# Patient Record
Sex: FEMALE | Race: WHITE | NOT HISPANIC OR LATINO | Employment: FULL TIME | URBAN - METROPOLITAN AREA
[De-identification: names, ages, dates, MRNs, and addresses within clinical notes are randomized per-mention and may not be internally consistent; named-entity substitution may affect disease eponyms.]

---

## 2023-09-13 ENCOUNTER — OFFICE VISIT (OUTPATIENT)
Dept: PEDIATRICS | Facility: CLINIC | Age: 23
End: 2023-09-13
Payer: COMMERCIAL

## 2023-09-13 VITALS — RESPIRATION RATE: 16 BRPM | WEIGHT: 149.69 LBS | BODY MASS INDEX: 22.69 KG/M2 | HEIGHT: 68 IN | TEMPERATURE: 97.9 F

## 2023-09-13 DIAGNOSIS — L03.115 CELLULITIS OF RIGHT LOWER EXTREMITY: Primary | ICD-10-CM

## 2023-09-13 PROCEDURE — 99214 OFFICE O/P EST MOD 30 MIN: CPT | Performed by: PEDIATRICS

## 2023-09-13 RX ORDER — SULFAMETHOXAZOLE AND TRIMETHOPRIM 800; 160 MG/1; MG/1
1 TABLET ORAL 2 TIMES DAILY
Qty: 14 TABLET | Refills: 0 | Status: SHIPPED | OUTPATIENT
Start: 2023-09-13 | End: 2023-09-20

## 2023-09-13 RX ORDER — CEFUROXIME AXETIL 500 MG/1
500 TABLET ORAL 2 TIMES DAILY
COMMUNITY
Start: 2023-09-12 | End: 2023-09-17

## 2023-09-13 RX ORDER — EPINEPHRINE 0.3 MG/.3ML
INJECTION SUBCUTANEOUS
COMMUNITY

## 2023-09-13 RX ORDER — NORETHINDRONE ACETATE/ETHINYL ESTRADIOL 1MG-20MCG
1 KIT ORAL DAILY
COMMUNITY
End: 2023-12-15

## 2023-09-13 ASSESSMENT — ENCOUNTER SYMPTOMS: PAIN: 1

## 2023-09-13 NOTE — LETTER
September 13, 2023     Patient: Iza Quigley   YOB: 2000   Date of Visit: 9/13/2023       To Whom It May Concern:    Iza Quigley was seen in my clinic on 9/13/2023 at 1:40 pm. Please excuse Iza for her absence from work on this day to make the appointment and on Thursday and Friday Sep 14th and 15th.    If you have any questions or concerns, please don't hesitate to call.         Sincerely,         Ashanti Frye MD        CC: No Recipients

## 2023-09-13 NOTE — PROGRESS NOTES
"Subjective   Patient ID: Iza Quigley is a 23 y.o. female who presents for Insect Bite, Edema, and Pain.    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  Did not go to work today  Some bruised areas  No more discharge from area, now scabbed over    No fever    Sitting outside but not in wooded area  [place]    Hard to walk  Not able to roll her ankle    Family friend prescribed cefuroxime 500mg BID, has taken 2 days worth of it      Edema  Pain         Review of Systems    Objective   Temp 36.6 °C (97.9 °F)   Resp 16   Ht 1.719 m (5' 7.68\")   Wt 67.9 kg (149 lb 11.1 oz)   BMI 22.98 kg/m²     Physical Exam  Musculoskeletal:        Feet:    Feet:      Comments: Right ankle with significant edema obscuring malleoli, tender to touch, lateral malleolus and superior to it with bruising visible. Small erythematous scab anterior ankle with surrounding erythema of about a palm size, swelling appears to extend to lower shin area  Skin:     General: Skin is warm and dry.      Findings: Bruising, erythema and lesion present.         Assessment/Plan   Problem List Items Addressed This Visit       Cellulitis of right lower extremity - Primary     Stop the antibiotic and start the new prescribed one. Please stay home from work the next 2 days, elevate your leg. You can bear weight on it.  If developing fever, redness or discharge please call right away.  Call if not better by Saturday.         Relevant Medications    sulfamethoxazole-trimethoprim (Bactrim DS) 800-160 mg tablet          "

## 2023-09-13 NOTE — ASSESSMENT & PLAN NOTE
Stop the antibiotic and start the new prescribed one. Please stay home from work the next 2 days, elevate your leg. You can bear weight on it.  If developing fever, redness or discharge please call right away.  Call if not better by Saturday.

## 2024-02-08 NOTE — PROGRESS NOTES
Subjective   Patient ID: Iza Quigley is a 24 y.o. female who presents for Establish Care and Annual Exam.  Well Adult Physical   Patient here for a comprehensive physical exam.The patient reports problems -patient has been taking clindamycin topically for her acne with good results, she also has been very happy with her birth control pills and would like to continue    Diet: Well-balanced    Exercise: Walking and active at work    Social History    Tobacco Use      Smoking status: Never      Smokeless tobacco: Never    Alcohol use: Yes      Comment: social    Drug use: Never        Immunization History  Administered            Date(s) Administered    DTaP vaccine, pediatric  (INFANRIX)                          2001  2001  2001                            07/15/2002  03/02/2006      DTaP, Unspecified     2001  2001  2001                            07/15/2002  03/02/2006      Flu vaccine (IIV4), preservative free *Check age/dose*                          11/10/2015  12/06/2017  10/12/2018                            01/04/2022      HPV 9-valent vaccine (GARDASIL 9)                          07/12/2016  10/05/2016  07/24/2017      Hepatitis A vaccine, pediatric/adolescent (HAVRIX, VAQTA)                          08/02/2011 06/11/2012      Hepatitis B vaccine, pediatric/adolescent (RECOMBIVAX, ENGERIX)                          2001 2001 01/26/2002      HiB PRP-OMP conjugate vaccine, pediatric (PEDVAXHIB)                          2001 2001 01/26/2002      HiB, unspecified      2001 2001 01/26/2002      Influenza Whole       11/26/2002 01/02/2003      Influenza, Unspecified                          11/26/2002  01/02/2003  09/23/2009                            10/07/2013      Influenza, live, intranasal                          11/03/2010  12/10/2012      Influenza, live, intranasal, quadrivalent                          11/03/2010  12/10/2012   11/05/2014      Influenza, seasonal, injectable, preservative free                          10/07/2013  11/04/2015  10/05/2016      MMR vaccine, subcutaneous (MMR II)                          04/20/2002 01/20/2003      Meningococcal B vaccine (BEXSERO)                          07/24/2017 08/02/2018      Meningococcal MCV4P   06/11/2012 07/24/2017      Novel influenza-H1N1-09, preservative-free                          02/15/2010      Pfizer Purple Cap SARS-CoV-2                          05/04/2021 05/25/2021 01/04/2022      Pneumococcal Conjugate PCV 7                          2001  2001  2001                            01/26/2002      Polio, Unspecified    2001  2001  2001                            03/02/2006      Poliovirus vaccine, subcutaneous (IPOL)                          2001  2001  2001                            03/02/2006      SARS-CoV-2, Unspecified                          01/04/2022      Tdap vaccine, age 7 year and older (BOOSTRIX, ADACEL)                          04/15/2011  08/18/2021  08/09/2023                            08/09/2023      Varicella vaccine, subcutaneous (VARIVAX)                          04/20/2002 08/14/2007      Menstrual cycle   When was your last period:    How often do they occur: Monthly   Do you have any concerns about your period:    Contraception: Oral birth control pills    Last pap: 2023  History of abnormal pap:                      Review of Systems   Constitutional:  Negative for appetite change, chills and fever.   HENT:  Negative for ear pain, rhinorrhea, sinus pain and sore throat.    Eyes:  Negative for discharge and visual disturbance.   Respiratory:  Negative for cough and shortness of breath.    Cardiovascular:  Negative for chest pain, palpitations and leg swelling.   Gastrointestinal:  Negative for abdominal distention, abdominal pain, blood in stool, constipation, diarrhea, nausea and vomiting.    Endocrine: Negative for cold intolerance, heat intolerance, polydipsia and polyuria.   Genitourinary:  Negative for difficulty urinating, dysuria and frequency.   Musculoskeletal:  Negative for back pain and myalgias.   Skin:  Negative for rash.   Neurological:  Negative for dizziness, weakness, numbness and headaches.   Hematological:  Negative for adenopathy.   Psychiatric/Behavioral:  Negative for agitation, confusion and hallucinations.        Objective   Physical Exam  Constitutional:       Appearance: Normal appearance.   Cardiovascular:      Rate and Rhythm: Normal rate and regular rhythm.   Pulmonary:      Effort: Pulmonary effort is normal.      Breath sounds: Normal breath sounds.   Chest:   Breasts:     Breasts are symmetrical.      Right: No mass, nipple discharge or skin change.      Left: No mass, nipple discharge or skin change.   Abdominal:      General: Abdomen is flat. Bowel sounds are normal.      Palpations: Abdomen is soft.   Lymphadenopathy:      Upper Body:      Right upper body: No axillary adenopathy.      Left upper body: No axillary adenopathy.   Skin:     General: Skin is warm and dry.   Neurological:      Mental Status: She is alert.   Psychiatric:         Mood and Affect: Mood normal.         Behavior: Behavior normal.         Assessment/Plan   Problem List Items Addressed This Visit       Healthcare maintenance - Primary    Relevant Orders    Lipid Panel    Glucose, fasting    HIV 1/2 Antigen/Antibody Screen with Reflex to Confirmation    Hepatitis C antibody    Encounter for surveillance of contraceptive pills    Relevant Medications    norethindrone ac-eth estradioL (Junel 1/20, 21,) 1-20 mg-mcg tablet    Acne    Relevant Medications    clindamycin (Cleocin T) 1 % gel     Follow-up in 1 year

## 2024-02-09 ENCOUNTER — OFFICE VISIT (OUTPATIENT)
Dept: PRIMARY CARE | Facility: CLINIC | Age: 24
End: 2024-02-09
Payer: COMMERCIAL

## 2024-02-09 VITALS
HEART RATE: 70 BPM | SYSTOLIC BLOOD PRESSURE: 112 MMHG | OXYGEN SATURATION: 97 % | HEIGHT: 68 IN | WEIGHT: 144 LBS | DIASTOLIC BLOOD PRESSURE: 84 MMHG | RESPIRATION RATE: 18 BRPM | BODY MASS INDEX: 21.82 KG/M2 | TEMPERATURE: 98 F

## 2024-02-09 DIAGNOSIS — L70.0 ACNE VULGARIS: ICD-10-CM

## 2024-02-09 DIAGNOSIS — Z30.41 ENCOUNTER FOR SURVEILLANCE OF CONTRACEPTIVE PILLS: ICD-10-CM

## 2024-02-09 DIAGNOSIS — Z00.00 HEALTHCARE MAINTENANCE: Primary | ICD-10-CM

## 2024-02-09 PROBLEM — L03.115 CELLULITIS OF RIGHT LOWER EXTREMITY: Status: RESOLVED | Noted: 2023-09-13 | Resolved: 2024-02-09

## 2024-02-09 PROBLEM — Z91.018 TREE NUT ALLERGY: Status: ACTIVE | Noted: 2024-02-09

## 2024-02-09 PROBLEM — L70.9 ACNE: Status: ACTIVE | Noted: 2024-02-09

## 2024-02-09 PROBLEM — Z91.010 PEANUT ALLERGY: Status: ACTIVE | Noted: 2024-02-09

## 2024-02-09 PROCEDURE — 1036F TOBACCO NON-USER: CPT | Performed by: FAMILY MEDICINE

## 2024-02-09 PROCEDURE — 99395 PREV VISIT EST AGE 18-39: CPT | Performed by: FAMILY MEDICINE

## 2024-02-09 RX ORDER — CLINDAMYCIN PHOSPHATE 10 MG/G
GEL TOPICAL 2 TIMES DAILY
Qty: 60 G | Refills: 11 | Status: SHIPPED | OUTPATIENT
Start: 2024-02-09 | End: 2025-02-08

## 2024-02-09 RX ORDER — EPINEPHRINE 0.3 MG/.3ML
1 INJECTION INTRAMUSCULAR ONCE AS NEEDED
COMMUNITY
End: 2024-02-09 | Stop reason: WASHOUT

## 2024-02-09 RX ORDER — NORETHINDRONE ACETATE AND ETHINYL ESTRADIOL .02; 1 MG/1; MG/1
1 TABLET ORAL DAILY
Qty: 84 TABLET | Refills: 3 | Status: SHIPPED | OUTPATIENT
Start: 2024-02-09

## 2024-02-09 ASSESSMENT — ENCOUNTER SYMPTOMS
POLYDIPSIA: 0
DYSURIA: 0
COUGH: 0
AGITATION: 0
DIZZINESS: 0
CONFUSION: 0
SORE THROAT: 0
PALPITATIONS: 0
SHORTNESS OF BREATH: 0
HEADACHES: 0
DIARRHEA: 0
CONSTIPATION: 0
SINUS PAIN: 0
NAUSEA: 0
ABDOMINAL DISTENTION: 0
DIFFICULTY URINATING: 0
FREQUENCY: 0
ADENOPATHY: 0
HALLUCINATIONS: 0
VOMITING: 0
CHILLS: 0
EYE DISCHARGE: 0
RHINORRHEA: 0
APPETITE CHANGE: 0
BACK PAIN: 0
MYALGIAS: 0
NUMBNESS: 0
BLOOD IN STOOL: 0
WEAKNESS: 0
FEVER: 0
ABDOMINAL PAIN: 0

## 2024-04-04 ENCOUNTER — OFFICE VISIT (OUTPATIENT)
Dept: PRIMARY CARE | Facility: CLINIC | Age: 24
End: 2024-04-04
Payer: COMMERCIAL

## 2024-04-04 VITALS
SYSTOLIC BLOOD PRESSURE: 122 MMHG | WEIGHT: 134 LBS | TEMPERATURE: 98.3 F | DIASTOLIC BLOOD PRESSURE: 84 MMHG | HEART RATE: 76 BPM | RESPIRATION RATE: 18 BRPM | OXYGEN SATURATION: 99 % | BODY MASS INDEX: 20.37 KG/M2

## 2024-04-04 DIAGNOSIS — K52.9 GASTROENTERITIS: Primary | ICD-10-CM

## 2024-04-04 DIAGNOSIS — R19.7 DIARRHEA, UNSPECIFIED TYPE: ICD-10-CM

## 2024-04-04 PROCEDURE — 99214 OFFICE O/P EST MOD 30 MIN: CPT | Performed by: FAMILY MEDICINE

## 2024-04-04 RX ORDER — ONDANSETRON 4 MG/1
4 TABLET, FILM COATED ORAL EVERY 8 HOURS PRN
Qty: 20 TABLET | Refills: 0 | Status: SHIPPED | OUTPATIENT
Start: 2024-04-04 | End: 2024-04-11

## 2024-04-04 NOTE — LETTER
April 4, 2024     Patient: Iza Quigley   YOB: 2000   Date of Visit: 4/4/2024       To Whom It May Concern:    Iza Quigley was seen in my clinic on 4/4/2024 at 5:45 pm. Please excuse Iza for her absence from work on this day to make the appointment.  She is able to return to work on 4/8/24    If you have any questions or concerns, please don't hesitate to call.         Sincerely,         Ashley Park MD        CC: No Recipients

## 2024-04-04 NOTE — PROGRESS NOTES
Subjective   Patient ID: Iza Quigley is a 24 y.o. female who presents for Nausea and Diarrhea.  HPI    Tues morning felt fine, then in the afternoon on Tuesday developed nausea vomiting and diarrhea.  She has had intermittent hot and cold flashes.  The nausea has persisted through today and she continues to have frequent loose stools particularly in the morning.  For example today she had 10 loose watery stools between 8 and noon but none since then.  She did go out to lunch with a friend of hers for hibachi Tuesday afternoon however her friend is not ill and they ate the same food.      Teacher    Review of Systems    Objective   Physical Exam  Constitutional:       Appearance: Normal appearance.   Cardiovascular:      Rate and Rhythm: Normal rate and regular rhythm.   Pulmonary:      Effort: Pulmonary effort is normal.      Breath sounds: Normal breath sounds.   Abdominal:      General: Abdomen is flat. Bowel sounds are normal.      Palpations: Abdomen is soft.   Skin:     General: Skin is warm and dry.   Neurological:      Mental Status: She is alert.   Psychiatric:         Mood and Affect: Mood normal.         Behavior: Behavior normal.         Assessment/Plan   Problem List Items Addressed This Visit       Gastroenteritis - Primary    Relevant Medications    ondansetron (Zofran) 4 mg tablet    Other Relevant Orders    C. difficile, PCR    Stool Pathogen Panel, PCR     Other Visit Diagnoses       Diarrhea, unspecified type        Relevant Medications    ondansetron (Zofran) 4 mg tablet    Other Relevant Orders    C. difficile, PCR    Stool Pathogen Panel, PCR          Follow-up if not improving by Monday

## 2024-04-04 NOTE — LETTER
April 4, 2024     Patient: Iza Quigley   YOB: 2000   Date of Visit: 4/4/2024       To Whom It May Concern:    Iza Quigley was seen in my clinic on 4/4/2024 at 5:45 pm. Please excuse Iza for her absence from work on this day to make the appointment.    If you have any questions or concerns, please don't hesitate to call.         Sincerely,         Ashley Park MD        CC: No Recipients

## 2024-04-24 DIAGNOSIS — L70.0 ACNE VULGARIS: ICD-10-CM

## 2024-04-25 RX ORDER — CLINDAMYCIN PHOSPHATE AND BENZOYL PEROXIDE 10; 50 MG/G; MG/G
GEL TOPICAL
Qty: 45 G | Refills: 0 | Status: SHIPPED | OUTPATIENT
Start: 2024-04-25

## 2024-08-09 ENCOUNTER — TELEPHONE (OUTPATIENT)
Dept: PRIMARY CARE | Facility: CLINIC | Age: 24
End: 2024-08-09

## 2024-08-09 NOTE — TELEPHONE ENCOUNTER
Iza Quigley phoned, [phone]. She would like to speak with you regarding her new birth control medicine. She has some questions. She did not know the name of the medication.  The medication below is the one listed on her chart.       norethindrone ac-eth estradioL (Junel 1/20, 21,) 1-20 mg-mcg tablet [[id number]]    Order Details  Dose: 1 tablet Route: oral Frequency: Daily   Dispense Quantity: 84 tablet Refills: 3          Sig: Take 1 tablet by mouth once daily.         Start Date: 02/09/24 End Date: --   Written Date: 02/09/24 Rx Expiration Date: 02/08/25        Associated Diagnoses: Encounter for surveillance of contraceptive pills [Z30.41]

## 2024-08-27 ENCOUNTER — APPOINTMENT (OUTPATIENT)
Dept: PRIMARY CARE | Facility: CLINIC | Age: 24
End: 2024-08-27
Payer: COMMERCIAL

## 2024-08-27 VITALS
BODY MASS INDEX: 20.52 KG/M2 | TEMPERATURE: 97.3 F | RESPIRATION RATE: 18 BRPM | HEART RATE: 68 BPM | HEIGHT: 68 IN | SYSTOLIC BLOOD PRESSURE: 94 MMHG | DIASTOLIC BLOOD PRESSURE: 68 MMHG | WEIGHT: 135.4 LBS | OXYGEN SATURATION: 100 %

## 2024-08-27 DIAGNOSIS — N93.9 ABNORMAL UTERINE BLEEDING (AUB): Primary | ICD-10-CM

## 2024-08-27 DIAGNOSIS — Z30.41 ENCOUNTER FOR SURVEILLANCE OF CONTRACEPTIVE PILLS: ICD-10-CM

## 2024-08-27 LAB
ERYTHROCYTE [DISTWIDTH] IN BLOOD BY AUTOMATED COUNT: 11.5 % (ref 11.5–14.5)
FSH SERPL-ACNC: 4.9 IU/L
HCT VFR BLD AUTO: 37.7 % (ref 36–46)
HGB BLD-MCNC: 12.3 G/DL (ref 12–16)
LH SERPL-ACNC: 6.1 IU/L
MCH RBC QN AUTO: 30.4 PG (ref 26–34)
MCHC RBC AUTO-ENTMCNC: 32.6 G/DL (ref 32–36)
MCV RBC AUTO: 93 FL (ref 80–100)
NRBC BLD-RTO: 0 /100 WBCS (ref 0–0)
PLATELET # BLD AUTO: 231 X10*3/UL (ref 150–450)
PROLACTIN SERPL-MCNC: 14 UG/L (ref 3–20)
RBC # BLD AUTO: 4.05 X10*6/UL (ref 4–5.2)
TSH SERPL-ACNC: 1.41 MIU/L (ref 0.44–3.98)
WBC # BLD AUTO: 5.1 X10*3/UL (ref 4.4–11.3)

## 2024-08-27 PROCEDURE — 83002 ASSAY OF GONADOTROPIN (LH): CPT

## 2024-08-27 PROCEDURE — 84443 ASSAY THYROID STIM HORMONE: CPT

## 2024-08-27 PROCEDURE — 84146 ASSAY OF PROLACTIN: CPT

## 2024-08-27 PROCEDURE — 83498 ASY HYDROXYPROGESTERONE 17-D: CPT

## 2024-08-27 PROCEDURE — 85027 COMPLETE CBC AUTOMATED: CPT

## 2024-08-27 PROCEDURE — 99214 OFFICE O/P EST MOD 30 MIN: CPT | Performed by: FAMILY MEDICINE

## 2024-08-27 PROCEDURE — 3008F BODY MASS INDEX DOCD: CPT | Performed by: FAMILY MEDICINE

## 2024-08-27 PROCEDURE — 84402 ASSAY OF FREE TESTOSTERONE: CPT

## 2024-08-27 PROCEDURE — 83001 ASSAY OF GONADOTROPIN (FSH): CPT

## 2024-08-27 RX ORDER — NORETHINDRONE ACETATE AND ETHINYL ESTRADIOL .02; 1 MG/1; MG/1
1 TABLET ORAL DAILY
Qty: 84 TABLET | Refills: 3 | Status: SHIPPED | OUTPATIENT
Start: 2024-08-27

## 2024-08-27 NOTE — PROGRESS NOTES
Subjective   Patient ID: Iza Quigley is a 24 y.o. female who presents for Follow-up (Irregular periods).  HPI    Prior to 4 years ago patient had her heavy periods despite being on birth control pills and then they seem to lighten up a little bit and were much more tolerable but then 2-1/2 years ago they became more irregular often spotting in between when she should have been getting her period but she would stop taking her birth control pills then and not resume them until her period was over.    She noticed this most often after intercourse.  She is taken multiple pregnancy tests at home and all of have been negative.      She denies any vaginal discharge or discomfort.      Review of Systems    Objective   Physical Exam  Constitutional:       Appearance: Normal appearance.   Cardiovascular:      Rate and Rhythm: Normal rate and regular rhythm.   Pulmonary:      Effort: Pulmonary effort is normal.      Breath sounds: Normal breath sounds.   Abdominal:      General: Abdomen is flat. Bowel sounds are normal.      Palpations: Abdomen is soft.   Skin:     General: Skin is warm and dry.   Neurological:      Mental Status: She is alert.   Psychiatric:         Mood and Affect: Mood normal.         Behavior: Behavior normal.         Assessment/Plan   Problem List Items Addressed This Visit       Encounter for surveillance of contraceptive pills    Relevant Medications    norethindrone ac-eth estradioL (Junel 1/20, 21,) 1-20 mg-mcg tablet    Other Relevant Orders    US pelvis     Other Visit Diagnoses       Abnormal uterine bleeding (AUB)    -  Primary    Relevant Orders    US pelvis    Prolactin    Testosterone,Free and Total    Luteinizing Hormone    Follicle Stimulating Hormone    17-Hydroxyprogesterone    Thyroid Stimulating Hormone    CBC          Follow up in 3 mos

## 2024-09-01 LAB
17OHP SERPL-MCNC: 205.52 NG/DL
TESTOSTERONE FREE (CHAN): 3.1 PG/ML (ref 0.1–6.4)
TESTOSTERONE,TOTAL,LC-MS/MS: 45 NG/DL (ref 2–45)

## 2024-09-02 NOTE — RESULT ENCOUNTER NOTE
Please call the patient. Normal blood work. Ultrasound pending. Will call with results when received. Follow up with Dr. Park as scheduled. TY

## 2024-09-12 ENCOUNTER — HOSPITAL ENCOUNTER (OUTPATIENT)
Dept: RADIOLOGY | Facility: CLINIC | Age: 24
Discharge: HOME | End: 2024-09-12
Payer: COMMERCIAL

## 2024-09-12 DIAGNOSIS — N93.9 ABNORMAL UTERINE BLEEDING (AUB): ICD-10-CM

## 2024-09-12 DIAGNOSIS — Z30.41 ENCOUNTER FOR SURVEILLANCE OF CONTRACEPTIVE PILLS: ICD-10-CM

## 2024-09-12 PROCEDURE — 76856 US EXAM PELVIC COMPLETE: CPT

## 2024-09-14 NOTE — RESULT ENCOUNTER NOTE
Please call the patient. No acute findings on ultrasound. Follow up in 2 months (she is scheduled for 2/10/25; needs to be seen earlier). Schedule appointment with Dr. Park. TY

## 2025-02-09 NOTE — PROGRESS NOTES
Subjective   Patient ID: Iza Quigley is a 25 y.o. female who presents for Annual Exam.  Well Adult Physical   Patient here for a comprehensive physical exam.The patient reports problems - Has not been taking OCPs, since then acne has been worsening and menses are heavy, more diffuse low back pain.  Typically worse when on menses     Diet: Well balanced     Exercise: walking 3-4 days per week at work     Social History    Tobacco Use      Smoking status: Never      Smokeless tobacco: Never    Alcohol use: Yes      Comment: social    Drug use: Never        Immunization History  Administered            Date(s) Administered    DTaP vaccine, pediatric  (INFANRIX)                          2001  2001  2001                            07/15/2002  03/02/2006      DTaP, Unspecified     2001  2001  2001                            07/15/2002  03/02/2006      Flu vaccine (IIV4), preservative free *Check age/dose*                          11/10/2015  12/06/2017  10/12/2018                            01/04/2022      Flu vaccine, trivalent, preservative free, age 6 months and greater (Fluarix/Fluzone/Flulaval)                          10/07/2013  11/04/2015  10/05/2016      HPV 9-valent vaccine (GARDASIL 9)                          07/12/2016  10/05/2016  07/24/2017      Hepatitis A vaccine, pediatric/adolescent (HAVRIX, VAQTA)                          08/02/2011 06/11/2012      Hepatitis B vaccine, 19 yrs and under (RECOMBIVAX, ENGERIX)                          2001 2001 01/26/2002      HiB PRP-OMP conjugate vaccine, pediatric (PEDVAXHIB)                          2001 2001 01/26/2002      HiB, unspecified      2001 2001 01/26/2002      Influenza Whole       11/26/2002 01/02/2003      Influenza, Unspecified                          11/26/2002  01/02/2003  09/23/2009                            10/07/2013      Influenza, live, intranasal                           11/03/2010  12/10/2012      Influenza, live, intranasal, quadrivalent                          11/03/2010  12/10/2012  11/05/2014      MMR vaccine, subcutaneous (MMR II)                          04/20/2002 01/20/2003      Meningococcal ACWY-D (Menactra) 4-valent conjugate vaccine                          06/11/2012 07/24/2017      Meningococcal B vaccine (BEXSERO)                          07/24/2017 08/02/2018      Novel influenza-H1N1-09, preservative-free                          02/15/2010      Pfizer Purple Cap SARS-CoV-2                          05/04/2021 05/25/2021 01/04/2022      Pneumococcal Conjugate PCV 7                          2001  2001  2001                            01/26/2002      Polio, Unspecified    2001  2001  2001                            03/02/2006      Poliovirus vaccine, subcutaneous (IPOL)                          2001  2001  2001                            03/02/2006      SARS-CoV-2, Unspecified                          01/04/2022      Tdap vaccine, age 7 year and older (BOOSTRIX, ADACEL)                          04/15/2011  08/18/2021  08/09/2023                            08/09/2023      Varicella vaccine, subcutaneous (VARIVAX)                          04/20/2002 08/14/2007      Menstrual cycle   When was your last period: 2/8/2025   How often do they occur: 1-2 times per month   Do you have any concerns about your period: would like to resume OCPS due to heaviness and some acne progression    Contraception: non currently    Last pap: 12/2022  History of abnormal pap: no                     Review of Systems   Constitutional:  Negative for appetite change, chills and fever.   HENT:  Negative for ear pain, rhinorrhea, sinus pain and sore throat.    Eyes:  Negative for discharge and visual disturbance.   Respiratory:  Negative for cough and shortness of breath.    Cardiovascular:  Negative for chest pain,  palpitations and leg swelling.   Gastrointestinal:  Negative for abdominal distention, abdominal pain, blood in stool, constipation, diarrhea, nausea and vomiting.   Endocrine: Negative for cold intolerance, heat intolerance, polydipsia and polyuria.   Genitourinary:  Negative for difficulty urinating, dysuria and frequency.   Musculoskeletal:  Negative for back pain and myalgias.   Skin:  Negative for rash.   Neurological:  Negative for dizziness, weakness, numbness and headaches.   Hematological:  Negative for adenopathy.   Psychiatric/Behavioral:  Negative for agitation, confusion and hallucinations.        Objective   Physical Exam  Constitutional:       Appearance: Normal appearance.   Cardiovascular:      Rate and Rhythm: Normal rate and regular rhythm.   Pulmonary:      Effort: Pulmonary effort is normal.      Breath sounds: Normal breath sounds.   Chest:   Breasts:     Breasts are symmetrical.      Right: No mass, nipple discharge or skin change.      Left: No mass, nipple discharge or skin change.   Abdominal:      General: Abdomen is flat. Bowel sounds are normal.      Palpations: Abdomen is soft.   Lymphadenopathy:      Upper Body:      Right upper body: No axillary adenopathy.      Left upper body: No axillary adenopathy.   Skin:     General: Skin is warm and dry.   Neurological:      Mental Status: She is alert.   Psychiatric:         Mood and Affect: Mood normal.         Behavior: Behavior normal.         Assessment & Plan  Encounter for surveillance of contraceptive pills         Acne vulgaris    Orders:    clindamycin-benzoyl peroxide (Duac) 1.2-5% gel; Apply topically once daily at bedtime.    Healthcare maintenance    Orders:    Lipid Panel    Glucose, fasting    HIV 1/2 Antigen/Antibody Screen with Reflex to Confirmation    Hepatitis C antibody    Tree nut allergy    Orders:    EPINEPHrine 0.3 mg/0.3 mL injection syringe; Inject 0.3 mL (0.3 mg) into the muscle 1 time if needed for anaphylaxis for  up to 1 dose. Inject into upper leg. Call 911 after use.       Follow up in 1 year

## 2025-02-10 ENCOUNTER — APPOINTMENT (OUTPATIENT)
Dept: PRIMARY CARE | Facility: CLINIC | Age: 25
End: 2025-02-10
Payer: COMMERCIAL

## 2025-02-10 VITALS
SYSTOLIC BLOOD PRESSURE: 114 MMHG | DIASTOLIC BLOOD PRESSURE: 80 MMHG | OXYGEN SATURATION: 96 % | RESPIRATION RATE: 16 BRPM | WEIGHT: 133.2 LBS | BODY MASS INDEX: 20.19 KG/M2 | HEART RATE: 76 BPM | TEMPERATURE: 98 F | HEIGHT: 68 IN

## 2025-02-10 DIAGNOSIS — Z30.41 ENCOUNTER FOR SURVEILLANCE OF CONTRACEPTIVE PILLS: ICD-10-CM

## 2025-02-10 DIAGNOSIS — Z91.018 TREE NUT ALLERGY: ICD-10-CM

## 2025-02-10 DIAGNOSIS — Z00.00 HEALTHCARE MAINTENANCE: Primary | ICD-10-CM

## 2025-02-10 DIAGNOSIS — L70.0 ACNE VULGARIS: ICD-10-CM

## 2025-02-10 PROBLEM — K52.9 GASTROENTERITIS: Status: RESOLVED | Noted: 2024-04-04 | Resolved: 2025-02-10

## 2025-02-10 PROCEDURE — 1036F TOBACCO NON-USER: CPT | Performed by: FAMILY MEDICINE

## 2025-02-10 PROCEDURE — 3008F BODY MASS INDEX DOCD: CPT | Performed by: FAMILY MEDICINE

## 2025-02-10 PROCEDURE — 99395 PREV VISIT EST AGE 18-39: CPT | Performed by: FAMILY MEDICINE

## 2025-02-10 RX ORDER — EPINEPHRINE 0.3 MG/.3ML
1 INJECTION SUBCUTANEOUS ONCE AS NEEDED
Qty: 2 ML | Refills: 1 | Status: SHIPPED | OUTPATIENT
Start: 2025-02-10

## 2025-02-10 RX ORDER — CLINDAMYCIN PHOSPHATE AND BENZOYL PEROXIDE 10; 50 MG/G; MG/G
GEL TOPICAL NIGHTLY
Qty: 45 G | Refills: 3 | Status: SHIPPED | OUTPATIENT
Start: 2025-02-10

## 2025-02-10 ASSESSMENT — ENCOUNTER SYMPTOMS
BLOOD IN STOOL: 0
WEAKNESS: 0
DIARRHEA: 0
DIFFICULTY URINATING: 0
FEVER: 0
PALPITATIONS: 0
AGITATION: 0
CONSTIPATION: 0
ABDOMINAL DISTENTION: 0
POLYDIPSIA: 0
RHINORRHEA: 0
HEADACHES: 0
EYE DISCHARGE: 0
FREQUENCY: 0
ABDOMINAL PAIN: 0
NAUSEA: 0
DIZZINESS: 0
NUMBNESS: 0
ADENOPATHY: 0
SHORTNESS OF BREATH: 0
SORE THROAT: 0
MYALGIAS: 0
HALLUCINATIONS: 0
CHILLS: 0
VOMITING: 0
BACK PAIN: 0
DYSURIA: 0
SINUS PAIN: 0
COUGH: 0
APPETITE CHANGE: 0
CONFUSION: 0

## 2025-02-10 ASSESSMENT — PATIENT HEALTH QUESTIONNAIRE - PHQ9
2. FEELING DOWN, DEPRESSED OR HOPELESS: NOT AT ALL
SUM OF ALL RESPONSES TO PHQ9 QUESTIONS 1 AND 2: 0
1. LITTLE INTEREST OR PLEASURE IN DOING THINGS: NOT AT ALL

## 2025-02-10 NOTE — ASSESSMENT & PLAN NOTE
Orders:    EPINEPHrine 0.3 mg/0.3 mL injection syringe; Inject 0.3 mL (0.3 mg) into the muscle 1 time if needed for anaphylaxis for up to 1 dose. Inject into upper leg. Call 911 after use.

## 2025-02-10 NOTE — ASSESSMENT & PLAN NOTE
Orders:    clindamycin-benzoyl peroxide (Duac) 1.2-5% gel; Apply topically once daily at bedtime.

## 2025-02-10 NOTE — ASSESSMENT & PLAN NOTE
Orders:    Lipid Panel    Glucose, fasting    HIV 1/2 Antigen/Antibody Screen with Reflex to Confirmation    Hepatitis C antibody

## 2025-02-11 LAB — GLUCOSE P FAST SERPL-MCNC: NORMAL MG/DL

## 2025-02-13 LAB
CHOLEST SERPL-MCNC: 148 MG/DL
CHOLEST/HDLC SERPL: 2.1 (CALC)
GLUCOSE SERPL-MCNC: 78 MG/DL (ref 65–99)
HCV AB SERPL QL IA: NORMAL
HDLC SERPL-MCNC: 70 MG/DL
HIV 1+2 AB+HIV1 P24 AG SERPL QL IA: NORMAL
LDLC SERPL CALC-MCNC: 60 MG/DL (CALC)
NONHDLC SERPL-MCNC: 78 MG/DL (CALC)
TRIGL SERPL-MCNC: 92 MG/DL

## 2025-02-25 ENCOUNTER — TELEPHONE (OUTPATIENT)
Dept: PRIMARY CARE | Facility: CLINIC | Age: 25
End: 2025-02-25
Payer: COMMERCIAL

## 2025-02-25 NOTE — TELEPHONE ENCOUNTER
Shannon called because she needs her physical signed and work placement documents for student teaching signed as well.  she will be faxing over the papers she needs sign.

## 2025-04-23 ENCOUNTER — APPOINTMENT (OUTPATIENT)
Dept: ALLERGY | Facility: CLINIC | Age: 25
End: 2025-04-23
Payer: COMMERCIAL

## 2025-06-06 ENCOUNTER — TELEPHONE (OUTPATIENT)
Dept: PRIMARY CARE | Facility: CLINIC | Age: 25
End: 2025-06-06
Payer: COMMERCIAL

## 2025-06-06 DIAGNOSIS — Z30.41 ENCOUNTER FOR SURVEILLANCE OF CONTRACEPTIVE PILLS: ICD-10-CM

## 2025-06-06 RX ORDER — NORETHINDRONE ACETATE AND ETHINYL ESTRADIOL .02; 1 MG/1; MG/1
1 TABLET ORAL DAILY
Qty: 84 TABLET | Refills: 0 | Status: SHIPPED | OUTPATIENT
Start: 2025-06-06

## 2025-06-06 NOTE — TELEPHONE ENCOUNTER
Spoke with the patient on the phone after being contacted by the answering service. The patient needs a refill of her oral birth control medication she has been taking the medication continuously and as prescribed since her last office visit with Dr. Park. She will follow up with Dr. Park for further refills. No other concerns today.

## 2025-06-24 NOTE — PROGRESS NOTES
Subjective   Patient ID:   [id number]   Iza Quigley is a 25 y.o. female who presents for Allergy Testing (NPV, previous pt of dr buitrago ).    Chief Complaint   Patient presents with    Allergy Testing     [facility], previous pt of dr buitrago       This is a new patient, presenting for allergy testing.    Patient has a H/O allergy symptoms since childhood and treated here several years ago.  She believes for insurance reasons she was switched to Dr. Buitrago.  He treated her for tree nut, peanut, bee venom and house dust allergies.  She reports her first reaction at age 19 occurred after eating a protein brownie bar that contained different nuts.  She notes it took about an hour to react and she went home.  She subsequently had emesis, diarrhea and throat tightening.  She called her mother who administered an EpiPen.  She has never had to use it since and currently avoids all tree nuts, almonds and peanuts.  She does not recall a reaction to peanut and she can consume products cooked in peanut oil with no reaction.  While in college, she had walnut dressing and developed an itchy throat but was surprised that the Sx did not progress to a severe one.  She has had drinks containing nuts but she never had any concerning reaction.    When she goes out to drink, she wants to drink Frangelico Hazelnut and had no problem.  She is unsure if they contain hazelnut or not.    A month ago, she accidentally almost ate an almond but spit it out before she could react.    10-9-2013 - SPT was positive to trees, cockroach, cat, weeds and grasses, Brazil nut, cashew, black walnut.  7-2-2008 - SPT was positive to dust mite, cat, ragweed, trees, mold, Black walnut, pistachio, coconut, weeds, grasses and horse.    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    Objective   Pulse 65   Wt 59.9 kg (132 lb)   SpO2 98%   BMI 20.07 kg/m²      Physical Exam  Constitutional:       Appearance: Normal appearance.   HENT:       Head: Normocephalic and atraumatic.   Eyes:      Extraocular Movements: Extraocular movements intact.      Conjunctiva/sclera: Conjunctivae normal.      Pupils: Pupils are equal, round, and reactive to light.   Pulmonary:      Effort: Pulmonary effort is normal.   Musculoskeletal:      Cervical back: Normal range of motion.   Neurological:      Mental Status: She is alert and oriented to person, place, and time. Mental status is at baseline.   Psychiatric:         Mood and Affect: Mood normal.         Behavior: Behavior normal.         Thought Content: Thought content normal.         Judgment: Judgment normal.     Allergy testing was performed on [employer] using standard technique. There were no immediate complications.    Test Results  Panel 1 Common Foods  1.   Histamine: 5X5  6.   Peanut: 0  10. [place]: 0  Panel 2 Nuts  1.   [place]: 0  2.   [place]: 5  3.   [place]: 5  4.   Cashew: 5  5.   Hazelnut: 0  7.   Pecan: 0  8.   Pistachio: 5    Skin testing is positive to cashew, pistachio, walnut and [place] nut.  [place] was mildly positive.      Assessment/Plan     Food allergy  She has allergies since childhood, initially treated here several years ago.  For insurance reasons, she was switched to Dr. Wiggins who performed testing positive to several aeroallergens and tree nuts.  Her first reaction was at age 19 after eating a protein brownie bar that contained different nuts.  An hour later, she had emesis, diarrhea and throat tightening and her mother gave her Epi.  She has not used it since.  She avoids all tree nuts, almonds and peanuts.  She does not recall a reaction to peanut and consumes products cooked in peanut oil with no reaction.  After having walnut dressing, she only developed an itchy throat but no progression of Sx.    Skin testing is positive to cashew, pistachio, walnut and [place] nut.  [place] was mildly positive.  Avoid these as well as pecan until results return.    Complete  blood work to evaluate almonds and peanut.      I discussed Xolair injections for accidental ingestions versus desensitization to tree nuts with Dr. Aster Garcia or [facility].     I ordered a Neffy nasal spray from [facility] with the same epinephrine you'd get from a needle-injector.  She has been hesitated to use her epi in the past due to the pain with the injection and her needle phobia. Neffy is her best option for rapid use of epi with accidental ingestion to her multiple allergens.    By signing my name below, I, Jase Mosqueraibe, attest that this documentation has been prepared under the direction and in the presence of Parvin Epperson MD.  All medical record entries made by the Scribe were at my direction and personally dictated by me. I have reviewed the chart and agree that the record accurately reflects my personal performance of the history, physical exam, discussion and plan.

## 2025-06-26 ENCOUNTER — APPOINTMENT (OUTPATIENT)
Dept: ALLERGY | Facility: CLINIC | Age: 25
End: 2025-06-26
Payer: COMMERCIAL

## 2025-06-26 VITALS — OXYGEN SATURATION: 98 % | BODY MASS INDEX: 20.07 KG/M2 | WEIGHT: 132 LBS | HEART RATE: 65 BPM

## 2025-06-26 DIAGNOSIS — Z91.018 FOOD ALLERGY: Primary | ICD-10-CM

## 2025-06-26 PROCEDURE — 99204 OFFICE O/P NEW MOD 45 MIN: CPT | Performed by: ALLERGY & IMMUNOLOGY

## 2025-06-26 PROCEDURE — 95004 PERQ TESTS W/ALRGNC XTRCS: CPT | Performed by: ALLERGY & IMMUNOLOGY

## 2025-06-26 RX ORDER — EPINEPHRINE 2 MG/100UL
1 SPRAY NASAL ONCE AS NEEDED
Qty: 2 EACH | Refills: 0 | Status: SHIPPED | OUTPATIENT
Start: 2025-06-26

## 2025-06-26 NOTE — PATIENT INSTRUCTIONS
Skin testing is positive to cashew, pistachio, walnut and [place] nut.  [place] was mildly positive.  Avoid these as well as pecan until results return.    Complete blood work to evaluate almonds and peanut.  I will follow up with you with results and further recommendations.     Consider Xolair injections for accidental ingestions versus desensitization to tree nuts with Dr. Aster Garcia or [facility].     Neffy is a nasal spray from [facility] with the same epinephrine you'd get from a needle-injector.  Alert  of your allergies.  If there is accidental exposure and you develop a few hives or itching, take 2 Zyrtec.  If you develops swelling of lips/tongue/mouth, hives all over, SOB, noisy breathing or difficulty swallowing, administer Neffy one spray.  Do not sniff during or after the dose is given.  If any liquid drips out of the nose, you may need to give a second dose of neffy after checking for symptoms.

## 2025-06-26 NOTE — ASSESSMENT & PLAN NOTE
She has allergies since childhood, initially treated here several years ago.  For insurance reasons, she was switched to Dr. Wiggins who performed testing positive to several aeroallergens and tree nuts.  Her first reaction was at age 19 after eating a protein brownie bar that contained different nuts.  An hour later, she had emesis, diarrhea and throat tightening and her mother gave her Epi.  She has not used it since.  She avoids all tree nuts, almonds and peanuts.  She does not recall a reaction to peanut and consumes products cooked in peanut oil with no reaction.  After having walnut dressing, she only developed an itchy throat but no progression of Sx.    Skin testing is positive to cashew, pistachio, walnut and Brazil nut.  [place] was mildly positive.  Avoid these as well as pecan until results return.    Complete blood work to evaluate almonds and peanut.      I discussed Xolair injections for accidental ingestions versus desensitization to tree nuts with Dr. Aster Garcia or [facility].     I ordered a Neffy nasal spray from [facility] with the same epinephrine you'd get from a needle-injector.  She has been hesitated to use her epi in the past due to the pain with the injection and her needle phobia. Neffy is her best option for rapid use of epi with accidental ingestion to her multiple allergens.

## 2025-06-26 NOTE — LETTER
June 28, 2025     Ashley Park MD  [address]  [facility] [address]    Patient: Iza Quigley   YOB: 2000   Date of Visit: 6/26/2025       Dear Dr. Ashley Park MD:    Thank you for referring Iza Quigley to me for evaluation. Below are my notes for this consultation.  If you have questions, please do not hesitate to call me. I look forward to following your patient along with you.       Sincerely,     Parvin Epperson MD      CC: No Recipients  ______________________________________________________________________________________      Subjective  Patient ID:   [id number]   Iza Quigley is a 25 y.o. female who presents for Allergy Testing (NPV, previous pt of dr buitrago ).    Chief Complaint   Patient presents with   • Allergy Testing     NPV, previous pt of dr buitrago       This is a new patient, presenting for allergy testing.    Patient has a H/O allergy symptoms since childhood and treated here several years ago.  She believes for insurance reasons she was switched to Dr. Buitrago.  He treated her for tree nut, peanut, bee venom and house dust allergies.  She reports her first reaction at age 19 occurred after eating a protein brownie bar that contained different nuts.  She notes it took about an hour to react and she went home.  She subsequently had emesis, diarrhea and throat tightening.  She called her mother who administered an EpiPen.  She has never had to use it since and currently avoids all tree nuts, almonds and peanuts.  She does not recall a reaction to peanut and she can consume products cooked in peanut oil with no reaction.  While in college, she had walnut dressing and developed an itchy throat but was surprised that the Sx did not progress to a severe one.  She has had drinks containing nuts but she never had any concerning reaction.    When she goes out to drink, she wants to drink Frangelico Hazelnut and had no problem.  She is unsure if they contain hazelnut or not.    A  month ago, she accidentally almost ate an almond but spit it out before she could react.    10-9-2013 - SPT was positive to trees, cockroach, cat, weeds and grasses, Brazil nut, cashew, black walnut.  7-2-2008 - SPT was positive to dust mite, cat, ragweed, trees, mold, Black walnut, pistachio, coconut, weeds, grasses and horse.    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    Objective  Pulse 65   Wt 59.9 kg (132 lb)   SpO2 98%   BMI 20.07 kg/m²      Physical Exam  Constitutional:       Appearance: Normal appearance.   HENT:      Head: Normocephalic and atraumatic.   Eyes:      Extraocular Movements: Extraocular movements intact.      Conjunctiva/sclera: Conjunctivae normal.      Pupils: Pupils are equal, round, and reactive to light.   Pulmonary:      Effort: Pulmonary effort is normal.   Musculoskeletal:      Cervical back: Normal range of motion.   Neurological:      Mental Status: She is alert and oriented to person, place, and time. Mental status is at baseline.   Psychiatric:         Mood and Affect: Mood normal.         Behavior: Behavior normal.         Thought Content: Thought content normal.         Judgment: Judgment normal.     Allergy testing was performed on [employer] using standard technique. There were no immediate complications.    Test Results  Panel 1 Common Foods  1.   Histamine: 5X5  6.   Peanut: 0  10. [place]: 0  Panel 2 Nuts  1.   [place]: 0  2.   [place]: 5  3.   [place]: 5  4.   Cashew: 5  5.   Hazelnut: 0  7.   Pecan: 0  8.   Pistachio: 5    Skin testing is positive to cashew, pistachio, walnut and [place] nut.  [place] was mildly positive.      Assessment/Plan    Food allergy  She has allergies since childhood, initially treated here several years ago.  For insurance reasons, she was switched to Dr. Wiggins who performed testing positive to several aeroallergens and tree nuts.  Her first reaction was at age 19 after eating a protein brownie bar  that contained different nuts.  An hour later, she had emesis, diarrhea and throat tightening and her mother gave her Epi.  She has not used it since.  She avoids all tree nuts, almonds and peanuts.  She does not recall a reaction to peanut and consumes products cooked in peanut oil with no reaction.  After having walnut dressing, she only developed an itchy throat but no progression of Sx.    Skin testing is positive to cashew, pistachio, walnut and [place] nut.  [place] was mildly positive.  Avoid these as well as pecan until results return.    Complete blood work to evaluate almonds and peanut.      I discussed Xolair injections for accidental ingestions versus desensitization to tree nuts with Dr. Aster Garcia or [facility].     I ordered a Neffy nasal spray from [facility] with the same epinephrine you'd get from a needle-injector.  She has been hesitated to use her epi in the past due to the pain with the injection and her needle phobia. Neffy is her best option for rapid use of epi with accidental ingestion to her multiple allergens.    By signing my name below, I, Jase Mosqueraibmadisyn, attest that this documentation has been prepared under the direction and in the presence of Parvin Epperson MD.  All medical record entries made by the Scribe were at my direction and personally dictated by me. I have reviewed the chart and agree that the record accurately reflects my personal performance of the history, physical exam, discussion and plan.

## 2025-06-27 LAB
ALMOND IGE QN: 0.49 KU/L
ALMOND IGE RAST: 1
ENGLISH WALNUT (RJUG R) 1 IGE QN: 1.2 KU/L
ENGLISH WALNUT (RJUG R) 3 IGE QN: <0.1 KU/L
HAZELNUT (NCOR A) 9 IGE QN: <0.1 KU/L
HAZELNUT (RCOR A) 1 IGE QN: 6.17 KU/L
HAZELNUT (RCOR A) 14 IGE QN: 0.22 KU/L
HAZELNUT (RCOR A) 8 IGE QN: <0.1 KU/L
HAZELNUT IGE QN: 3.23 KU/L
HAZELNUT IGE RAST: 2
IGE SERPL-ACNC: 338 KU/L
PEANUT IGE QN: 0.52 KU/L
PEANUT IGE RAST: 1
PECAN/HICK NUT IGE QN: 0.52 KU/L
PECAN/HICK NUT IGE RAST: 1
REF LAB TEST REF RANGE: NORMAL
WALNUT IGE QN: 1.14 KU/L
WALNUT IGE RAST: 2

## 2025-07-02 ENCOUNTER — APPOINTMENT (OUTPATIENT)
Dept: PRIMARY CARE | Facility: CLINIC | Age: 25
End: 2025-07-02
Payer: COMMERCIAL

## 2025-07-02 VITALS
HEART RATE: 65 BPM | WEIGHT: 135.6 LBS | SYSTOLIC BLOOD PRESSURE: 102 MMHG | BODY MASS INDEX: 20.55 KG/M2 | OXYGEN SATURATION: 98 % | DIASTOLIC BLOOD PRESSURE: 57 MMHG | RESPIRATION RATE: 16 BRPM | HEIGHT: 68 IN | TEMPERATURE: 97.4 F

## 2025-07-02 DIAGNOSIS — Z30.41 ENCOUNTER FOR SURVEILLANCE OF CONTRACEPTIVE PILLS: ICD-10-CM

## 2025-07-02 DIAGNOSIS — L70.9 ACNE, UNSPECIFIED ACNE TYPE: Primary | ICD-10-CM

## 2025-07-02 PROCEDURE — 99214 OFFICE O/P EST MOD 30 MIN: CPT | Performed by: FAMILY MEDICINE

## 2025-07-02 RX ORDER — NORGESTIMATE AND ETHINYL ESTRADIOL 0.25-0.035
1 KIT ORAL DAILY
Qty: 28 TABLET | Refills: 3 | Status: SHIPPED | OUTPATIENT
Start: 2025-07-02 | End: 2026-07-02

## 2025-07-02 NOTE — ASSESSMENT & PLAN NOTE
Orders:    norgestimate-ethinyl estradiol (Sprintec, 28,) 0.25-0.035 mg tablet; Take 1 tablet by mouth once daily.    Follow Up In [facility] - PCP - Established; Future

## 2025-07-02 NOTE — PROGRESS NOTES
Subjective   Patient ID: Iza Quigley is a 25 y.o. female who presents for Contraception.  HPI    Feels like acne has been much worse since starting OCP.  She states that despite using her Duac and spot acne treatment she feels like her acne is still worsening.    Overall otherwise she feels like the birth control pills are working her periods are manageable coming on time.  Has good compliance and otherwise good tolerance of the medication.    Review of Systems    Objective   Physical Exam  Constitutional:       Appearance: Normal appearance.   Cardiovascular:      Rate and Rhythm: Normal rate and regular rhythm.   Pulmonary:      Effort: Pulmonary effort is normal.      Breath sounds: Normal breath sounds.   Abdominal:      General: Abdomen is flat. Bowel sounds are normal.      Palpations: Abdomen is soft.   Skin:     Comments: Moderate acne on face mostly on cheeks   Neurological:      Mental Status: She is alert.   Psychiatric:         Mood and Affect: Mood normal.         Behavior: Behavior normal.         Assessment & Plan  Encounter for surveillance of contraceptive pills    Orders:    norgestimate-ethinyl estradiol (Sprintec, 28,) 0.25-0.035 mg tablet; Take 1 tablet by mouth once daily.    Follow Up In [facility] - PCP - Established; Future    Acne, unspecified acne type    Orders:    norgestimate-ethinyl estradiol (Sprintec, 28,) 0.25-0.035 mg tablet; Take 1 tablet by mouth once daily.    Follow Up In [facility] - PCP - Established; Future    Follow-up in 3 months

## 2025-07-02 NOTE — PATIENT INSTRUCTIONS
Dermatologists  Please check to make sure the specialist accepts your insurance plan prior to your appointment.    [facility] [place]  [facility]    [phone]    [facility] [place]  [facility]   [phone]    [facility]atology [facility]   Dr. Paddy Garcia   [phone]    [facility]  Dr. Caruso or [facility]   [phone]

## 2025-07-10 DIAGNOSIS — Z91.018 FOOD ALLERGY: ICD-10-CM

## 2025-07-10 RX ORDER — EPINEPHRINE 2 MG/100UL
1 SPRAY NASAL ONCE AS NEEDED
Qty: 2 EACH | Refills: 0 | Status: SHIPPED | OUTPATIENT
Start: 2025-07-10

## 2025-08-06 ENCOUNTER — TELEPHONE (OUTPATIENT)
Dept: PRIMARY CARE | Facility: CLINIC | Age: 25
End: 2025-08-06
Payer: COMMERCIAL